# Patient Record
Sex: MALE | Race: WHITE
[De-identification: names, ages, dates, MRNs, and addresses within clinical notes are randomized per-mention and may not be internally consistent; named-entity substitution may affect disease eponyms.]

---

## 2020-09-17 ENCOUNTER — HOSPITAL ENCOUNTER (OUTPATIENT)
Dept: HOSPITAL 96 - M.PUL | Age: 70
End: 2020-09-17
Attending: ORTHOPAEDIC SURGERY
Payer: COMMERCIAL

## 2020-09-17 DIAGNOSIS — J45.998: Primary | ICD-10-CM

## 2020-09-27 NOTE — PF
[address]  [address]                    PULMONARY FUNCTION REPORT     
_______________________________________________________________________________
 
Name:       ARVIND GARY                 Room:                      REG CLI 
M.R.#:  [id number]      Account #:      [id number]  
Admission:  09/17/20     Attend Phys:    Arvind Henriquez MD   
Discharge:               Date of Birth:  [birth year]  
         Report #: [id number]
                                                                     [id number]  
_______________________________________________________________________________
THIS REPORT FOR:  //name//                      
 
CC: Arvind Bellamosathya
 
DATE OF SERVICE:  09/17/2020
 
 
The FEV1/FVC ratio is decreased to 61% with a forced vital capacity normal at
86% with the FEV1 decreased to 72%.  The ENY14-09 is also decreased to 45%. 
After the administration of a bronchodilator, there is no significant increase
in any of these values.  The patient's post-bronchodilator FEV1 is noted to be
2.56 liters.
 
Only a spirometry was performed.
 
IMPRESSION:  Moderate obstruction without evidence of reversibility.
 
 
 
 
 
 
 
 
 
 
 
 
 
 
 
 
 
 
 
 
 
 
 
 
 
 
 
<ELECTRONICALLY SIGNED>
                                        By:  Ken Price MD              
09/27/20     1815
D: 09/26/20 1915_______________________________________
T: 09/26/20 Rafy Price MD                 /nt